# Patient Record
Sex: FEMALE | Race: ASIAN | NOT HISPANIC OR LATINO | Employment: FULL TIME
[De-identification: names, ages, dates, MRNs, and addresses within clinical notes are randomized per-mention and may not be internally consistent; named-entity substitution may affect disease eponyms.]

---

## 2024-05-19 ENCOUNTER — HEALTH MAINTENANCE LETTER (OUTPATIENT)
Age: 34
End: 2024-05-19

## 2025-01-24 ENCOUNTER — HOSPITAL ENCOUNTER (OUTPATIENT)
Facility: CLINIC | Age: 35
Discharge: HOME | DRG: 819 | End: 2025-01-25
Attending: EMERGENCY MEDICINE | Admitting: OBSTETRICS & GYNECOLOGY
Payer: COMMERCIAL

## 2025-01-24 ENCOUNTER — ANESTHESIA EVENT (OUTPATIENT)
Dept: SURGERY | Facility: CLINIC | Age: 35
DRG: 819 | End: 2025-01-24
Payer: COMMERCIAL

## 2025-01-24 ENCOUNTER — APPOINTMENT (OUTPATIENT)
Dept: ULTRASOUND IMAGING | Facility: CLINIC | Age: 35
DRG: 819 | End: 2025-01-24
Attending: EMERGENCY MEDICINE
Payer: COMMERCIAL

## 2025-01-24 ENCOUNTER — ANESTHESIA (OUTPATIENT)
Dept: SURGERY | Facility: CLINIC | Age: 35
DRG: 819 | End: 2025-01-24
Payer: COMMERCIAL

## 2025-01-24 DIAGNOSIS — O00.90 RUPTURED ECTOPIC PREGNANCY: ICD-10-CM

## 2025-01-24 LAB
ABO + RH BLD: NORMAL
ANION GAP SERPL CALCULATED.3IONS-SCNC: 12 MMOL/L (ref 7–15)
BASOPHILS # BLD AUTO: 0 10E3/UL (ref 0–0.2)
BASOPHILS NFR BLD AUTO: 0 %
BLD GP AB SCN SERPL QL: NEGATIVE
BUN SERPL-MCNC: 13.7 MG/DL (ref 6–20)
CALCIUM SERPL-MCNC: 9 MG/DL (ref 8.8–10.4)
CHLORIDE SERPL-SCNC: 99 MMOL/L (ref 98–107)
CREAT SERPL-MCNC: 0.54 MG/DL (ref 0.51–0.95)
EGFRCR SERPLBLD CKD-EPI 2021: >90 ML/MIN/1.73M2
EOSINOPHIL # BLD AUTO: 0.3 10E3/UL (ref 0–0.7)
EOSINOPHIL NFR BLD AUTO: 2 %
ERYTHROCYTE [DISTWIDTH] IN BLOOD BY AUTOMATED COUNT: 11.9 % (ref 10–15)
GLUCOSE SERPL-MCNC: 99 MG/DL (ref 70–99)
HCG INTACT+B SERPL-ACNC: ABNORMAL MIU/ML
HCO3 SERPL-SCNC: 21 MMOL/L (ref 22–29)
HCT VFR BLD AUTO: 34.8 % (ref 35–47)
HGB BLD-MCNC: 11.3 G/DL (ref 11.7–15.7)
IMM GRANULOCYTES # BLD: 0.1 10E3/UL
IMM GRANULOCYTES NFR BLD: 1 %
LYMPHOCYTES # BLD AUTO: 3.8 10E3/UL (ref 0.8–5.3)
LYMPHOCYTES NFR BLD AUTO: 29 %
MCH RBC QN AUTO: 29.4 PG (ref 26.5–33)
MCHC RBC AUTO-ENTMCNC: 32.5 G/DL (ref 31.5–36.5)
MCV RBC AUTO: 91 FL (ref 78–100)
MONOCYTES # BLD AUTO: 0.7 10E3/UL (ref 0–1.3)
MONOCYTES NFR BLD AUTO: 5 %
NEUTROPHILS # BLD AUTO: 8.5 10E3/UL (ref 1.6–8.3)
NEUTROPHILS NFR BLD AUTO: 63 %
NRBC # BLD AUTO: 0 10E3/UL
NRBC BLD AUTO-RTO: 0 /100
PLATELET # BLD AUTO: 372 10E3/UL (ref 150–450)
POTASSIUM SERPL-SCNC: 3.8 MMOL/L (ref 3.4–5.3)
RADIOLOGIST FLAGS: ABNORMAL
RBC # BLD AUTO: 3.84 10E6/UL (ref 3.8–5.2)
SODIUM SERPL-SCNC: 132 MMOL/L (ref 135–145)
SPECIMEN EXP DATE BLD: NORMAL
WBC # BLD AUTO: 13.4 10E3/UL (ref 4–11)

## 2025-01-24 PROCEDURE — 250N000025 HC SEVOFLURANE, PER MIN: Performed by: OBSTETRICS & GYNECOLOGY

## 2025-01-24 PROCEDURE — 84702 CHORIONIC GONADOTROPIN TEST: CPT | Performed by: EMERGENCY MEDICINE

## 2025-01-24 PROCEDURE — 0UT54ZZ RESECTION OF RIGHT FALLOPIAN TUBE, PERCUTANEOUS ENDOSCOPIC APPROACH: ICD-10-PCS | Performed by: OBSTETRICS & GYNECOLOGY

## 2025-01-24 PROCEDURE — 258N000003 HC RX IP 258 OP 636: Performed by: ANESTHESIOLOGY

## 2025-01-24 PROCEDURE — 99291 CRITICAL CARE FIRST HOUR: CPT | Mod: 25

## 2025-01-24 PROCEDURE — 76801 OB US < 14 WKS SINGLE FETUS: CPT

## 2025-01-24 PROCEDURE — 84520 ASSAY OF UREA NITROGEN: CPT | Performed by: EMERGENCY MEDICINE

## 2025-01-24 PROCEDURE — 250N000011 HC RX IP 250 OP 636: Performed by: ANESTHESIOLOGY

## 2025-01-24 PROCEDURE — 86900 BLOOD TYPING SEROLOGIC ABO: CPT | Performed by: EMERGENCY MEDICINE

## 2025-01-24 PROCEDURE — 85025 COMPLETE CBC W/AUTO DIFF WBC: CPT | Performed by: EMERGENCY MEDICINE

## 2025-01-24 PROCEDURE — 710N000012 HC RECOVERY PHASE 2, PER MINUTE: Performed by: OBSTETRICS & GYNECOLOGY

## 2025-01-24 PROCEDURE — 250N000011 HC RX IP 250 OP 636: Performed by: NURSE ANESTHETIST, CERTIFIED REGISTERED

## 2025-01-24 PROCEDURE — 272N000001 HC OR GENERAL SUPPLY STERILE: Performed by: OBSTETRICS & GYNECOLOGY

## 2025-01-24 PROCEDURE — 82435 ASSAY OF BLOOD CHLORIDE: CPT | Performed by: EMERGENCY MEDICINE

## 2025-01-24 PROCEDURE — 710N000009 HC RECOVERY PHASE 1, LEVEL 1, PER MIN: Performed by: OBSTETRICS & GYNECOLOGY

## 2025-01-24 PROCEDURE — 80048 BASIC METABOLIC PNL TOTAL CA: CPT | Performed by: EMERGENCY MEDICINE

## 2025-01-24 PROCEDURE — 999N000141 HC STATISTIC PRE-PROCEDURE NURSING ASSESSMENT: Performed by: OBSTETRICS & GYNECOLOGY

## 2025-01-24 PROCEDURE — 88305 TISSUE EXAM BY PATHOLOGIST: CPT | Mod: TC | Performed by: OBSTETRICS & GYNECOLOGY

## 2025-01-24 PROCEDURE — 370N000017 HC ANESTHESIA TECHNICAL FEE, PER MIN: Performed by: OBSTETRICS & GYNECOLOGY

## 2025-01-24 PROCEDURE — 88305 TISSUE EXAM BY PATHOLOGIST: CPT | Mod: 26 | Performed by: PATHOLOGY

## 2025-01-24 PROCEDURE — 36415 COLL VENOUS BLD VENIPUNCTURE: CPT | Performed by: EMERGENCY MEDICINE

## 2025-01-24 PROCEDURE — 76705 ECHO EXAM OF ABDOMEN: CPT

## 2025-01-24 PROCEDURE — 360N000075 HC SURGERY LEVEL 2, PER MIN: Performed by: OBSTETRICS & GYNECOLOGY

## 2025-01-24 PROCEDURE — 250N000009 HC RX 250: Performed by: ANESTHESIOLOGY

## 2025-01-24 RX ORDER — SODIUM CHLORIDE, SODIUM LACTATE, POTASSIUM CHLORIDE, CALCIUM CHLORIDE 600; 310; 30; 20 MG/100ML; MG/100ML; MG/100ML; MG/100ML
INJECTION, SOLUTION INTRAVENOUS CONTINUOUS PRN
Status: DISCONTINUED | OUTPATIENT
Start: 2025-01-24 | End: 2025-01-25

## 2025-01-24 RX ORDER — KETOROLAC TROMETHAMINE 30 MG/ML
INJECTION, SOLUTION INTRAMUSCULAR; INTRAVENOUS PRN
Status: DISCONTINUED | OUTPATIENT
Start: 2025-01-24 | End: 2025-01-25

## 2025-01-24 RX ORDER — PROPOFOL 10 MG/ML
INJECTION, EMULSION INTRAVENOUS PRN
Status: DISCONTINUED | OUTPATIENT
Start: 2025-01-24 | End: 2025-01-25

## 2025-01-24 RX ORDER — ONDANSETRON 2 MG/ML
INJECTION INTRAMUSCULAR; INTRAVENOUS PRN
Status: DISCONTINUED | OUTPATIENT
Start: 2025-01-24 | End: 2025-01-25

## 2025-01-24 RX ORDER — FENTANYL CITRATE 50 UG/ML
INJECTION, SOLUTION INTRAMUSCULAR; INTRAVENOUS PRN
Status: DISCONTINUED | OUTPATIENT
Start: 2025-01-24 | End: 2025-01-25

## 2025-01-24 RX ORDER — DEXAMETHASONE SODIUM PHOSPHATE 4 MG/ML
INJECTION, SOLUTION INTRA-ARTICULAR; INTRALESIONAL; INTRAMUSCULAR; INTRAVENOUS; SOFT TISSUE PRN
Status: DISCONTINUED | OUTPATIENT
Start: 2025-01-24 | End: 2025-01-25

## 2025-01-24 RX ORDER — BUPIVACAINE HYDROCHLORIDE AND EPINEPHRINE 2.5; 5 MG/ML; UG/ML
INJECTION, SOLUTION INFILTRATION; PERINEURAL PRN
Status: DISCONTINUED | OUTPATIENT
Start: 2025-01-24 | End: 2025-01-25 | Stop reason: HOSPADM

## 2025-01-24 RX ADMIN — SODIUM CHLORIDE, POTASSIUM CHLORIDE, SODIUM LACTATE AND CALCIUM CHLORIDE: 600; 310; 30; 20 INJECTION, SOLUTION INTRAVENOUS at 22:34

## 2025-01-24 RX ADMIN — ROCURONIUM BROMIDE 20 MG: 50 INJECTION, SOLUTION INTRAVENOUS at 23:17

## 2025-01-24 RX ADMIN — SODIUM CHLORIDE, POTASSIUM CHLORIDE, SODIUM LACTATE AND CALCIUM CHLORIDE: 600; 310; 30; 20 INJECTION, SOLUTION INTRAVENOUS at 23:41

## 2025-01-24 RX ADMIN — SUCCINYLCHOLINE CHLORIDE 120 MG: 20 INJECTION, SOLUTION INTRAMUSCULAR; INTRAVENOUS; PARENTERAL at 22:44

## 2025-01-24 RX ADMIN — ONDANSETRON 4 MG: 2 INJECTION INTRAMUSCULAR; INTRAVENOUS at 23:30

## 2025-01-24 RX ADMIN — FENTANYL CITRATE 50 MCG: 50 INJECTION INTRAMUSCULAR; INTRAVENOUS at 22:58

## 2025-01-24 RX ADMIN — PROPOFOL 160 MG: 10 INJECTION, EMULSION INTRAVENOUS at 22:44

## 2025-01-24 RX ADMIN — PHENYLEPHRINE HYDROCHLORIDE 100 MCG: 10 INJECTION INTRAVENOUS at 23:04

## 2025-01-24 RX ADMIN — MIDAZOLAM 2 MG: 1 INJECTION INTRAMUSCULAR; INTRAVENOUS at 22:39

## 2025-01-24 RX ADMIN — ROCURONIUM BROMIDE 30 MG: 50 INJECTION, SOLUTION INTRAVENOUS at 22:58

## 2025-01-24 RX ADMIN — DEXAMETHASONE SODIUM PHOSPHATE 4 MG: 4 INJECTION, SOLUTION INTRA-ARTICULAR; INTRALESIONAL; INTRAMUSCULAR; INTRAVENOUS; SOFT TISSUE at 22:48

## 2025-01-24 RX ADMIN — HYDROMORPHONE HYDROCHLORIDE 0.5 MG: 1 INJECTION, SOLUTION INTRAMUSCULAR; INTRAVENOUS; SUBCUTANEOUS at 23:10

## 2025-01-24 RX ADMIN — FENTANYL CITRATE 50 MCG: 50 INJECTION INTRAMUSCULAR; INTRAVENOUS at 22:44

## 2025-01-24 RX ADMIN — KETOROLAC TROMETHAMINE 30 MG: 30 INJECTION, SOLUTION INTRAMUSCULAR at 23:49

## 2025-01-24 ASSESSMENT — ACTIVITIES OF DAILY LIVING (ADL)
ADLS_ACUITY_SCORE: 41

## 2025-01-24 NOTE — LETTER
2025    Sherley Moraes   1990        To Whom it May Concern;      Please excuse Sherley Moraes from work until  due to emergent surgery at [facility] on 25.       Please do not hesitate to contact my office if you have any questions.       Sincerely,    Jena Perez MD  Obstetrics, Gynecology and Infertility  [phone]  25

## 2025-01-25 VITALS
OXYGEN SATURATION: 98 % | TEMPERATURE: 97.3 F | DIASTOLIC BLOOD PRESSURE: 77 MMHG | RESPIRATION RATE: 18 BRPM | SYSTOLIC BLOOD PRESSURE: 118 MMHG | HEART RATE: 106 BPM

## 2025-01-25 PROCEDURE — 250N000009 HC RX 250: Performed by: OBSTETRICS & GYNECOLOGY

## 2025-01-25 PROCEDURE — 250N000009 HC RX 250: Performed by: NURSE ANESTHETIST, CERTIFIED REGISTERED

## 2025-01-25 RX ORDER — SODIUM CHLORIDE, SODIUM LACTATE, POTASSIUM CHLORIDE, CALCIUM CHLORIDE 600; 310; 30; 20 MG/100ML; MG/100ML; MG/100ML; MG/100ML
INJECTION, SOLUTION INTRAVENOUS CONTINUOUS
Status: DISCONTINUED | OUTPATIENT
Start: 2025-01-25 | End: 2025-01-25 | Stop reason: HOSPADM

## 2025-01-25 RX ORDER — DEXAMETHASONE SODIUM PHOSPHATE 4 MG/ML
4 INJECTION, SOLUTION INTRA-ARTICULAR; INTRALESIONAL; INTRAMUSCULAR; INTRAVENOUS; SOFT TISSUE
Status: DISCONTINUED | OUTPATIENT
Start: 2025-01-25 | End: 2025-01-25 | Stop reason: HOSPADM

## 2025-01-25 RX ORDER — ACETAMINOPHEN 325 MG/1
975 TABLET ORAL ONCE
Status: DISCONTINUED | OUTPATIENT
Start: 2025-01-25 | End: 2025-01-25 | Stop reason: HOSPADM

## 2025-01-25 RX ORDER — OXYCODONE HYDROCHLORIDE 5 MG/1
5 TABLET ORAL
Status: DISCONTINUED | OUTPATIENT
Start: 2025-01-25 | End: 2025-01-25 | Stop reason: HOSPADM

## 2025-01-25 RX ORDER — FENTANYL CITRATE 0.05 MG/ML
50 INJECTION, SOLUTION INTRAMUSCULAR; INTRAVENOUS EVERY 5 MIN PRN
Status: DISCONTINUED | OUTPATIENT
Start: 2025-01-25 | End: 2025-01-25 | Stop reason: HOSPADM

## 2025-01-25 RX ORDER — IBUPROFEN 400 MG/1
800 TABLET, FILM COATED ORAL ONCE
Status: DISCONTINUED | OUTPATIENT
Start: 2025-01-25 | End: 2025-01-25 | Stop reason: HOSPADM

## 2025-01-25 RX ORDER — FENTANYL CITRATE 0.05 MG/ML
25 INJECTION, SOLUTION INTRAMUSCULAR; INTRAVENOUS EVERY 5 MIN PRN
Status: DISCONTINUED | OUTPATIENT
Start: 2025-01-25 | End: 2025-01-25 | Stop reason: HOSPADM

## 2025-01-25 RX ORDER — HYDROMORPHONE HCL IN WATER/PF 6 MG/30 ML
0.4 PATIENT CONTROLLED ANALGESIA SYRINGE INTRAVENOUS EVERY 5 MIN PRN
Status: DISCONTINUED | OUTPATIENT
Start: 2025-01-25 | End: 2025-01-25 | Stop reason: HOSPADM

## 2025-01-25 RX ORDER — NALOXONE HYDROCHLORIDE 0.4 MG/ML
0.1 INJECTION, SOLUTION INTRAMUSCULAR; INTRAVENOUS; SUBCUTANEOUS
Status: DISCONTINUED | OUTPATIENT
Start: 2025-01-25 | End: 2025-01-25 | Stop reason: HOSPADM

## 2025-01-25 RX ORDER — ONDANSETRON 2 MG/ML
4 INJECTION INTRAMUSCULAR; INTRAVENOUS EVERY 30 MIN PRN
Status: DISCONTINUED | OUTPATIENT
Start: 2025-01-25 | End: 2025-01-25 | Stop reason: HOSPADM

## 2025-01-25 RX ORDER — ONDANSETRON 4 MG/1
4 TABLET, ORALLY DISINTEGRATING ORAL EVERY 30 MIN PRN
Status: DISCONTINUED | OUTPATIENT
Start: 2025-01-25 | End: 2025-01-25 | Stop reason: HOSPADM

## 2025-01-25 RX ORDER — HYDROMORPHONE HCL IN WATER/PF 6 MG/30 ML
0.2 PATIENT CONTROLLED ANALGESIA SYRINGE INTRAVENOUS EVERY 5 MIN PRN
Status: DISCONTINUED | OUTPATIENT
Start: 2025-01-25 | End: 2025-01-25 | Stop reason: HOSPADM

## 2025-01-25 RX ADMIN — Medication 200 MG: at 00:02

## 2025-01-25 ASSESSMENT — ACTIVITIES OF DAILY LIVING (ADL): ADLS_ACUITY_SCORE: 41

## 2025-01-25 NOTE — BRIEF OP NOTE
OB/GYN Brief Operative Note    Pre-operative diagnosis: Ruptured right ectopic pregnancy  Hemoperitoneum   Post-operative diagnosis: Same   Procedure: Laparoscopic right salpingectomy, removal of ectopic pregnancy   Surgeon: Staci Perez MD   Assistant(s): OR Staff   Anesthesia: General Endotracheal Anesthesia   Estimated blood loss: 10 mL   Total IV fluids: (See anesthesia record)   Blood transfusion: No transfusion was given during surgery   Total urine output: (See anesthesia record)  100ml   Drains: None   Specimens: Ectopic pregnancy   Findings: Large amount of hemoperitoneum, approximately 300 mL of clot evacuated from abdomen.   Large right ectopic pregnancy - approximately 4 cm in diameter extending the entire length of the fallopian tube. The uterus, left ovary, right ovary and left fallopian tube appeared normal.   The colon appeared inflamed and there was a portion that I questioned whether or not the serosa was denuded but Dr. Aguayo came in and felt it was not involving the bowel, just the epiploica.    Complications: None   Condition: Stable, transferred to PACU   Comments: See accompanying operative report for full details     Staci Perez MD  OB/GYN & Infertility  January 24, 2025

## 2025-01-25 NOTE — ANESTHESIA PREPROCEDURE EVALUATION
"Anesthesia Pre-Procedure Evaluation    Patient: Sherley Moraes   MRN: [id number] : 1990        Procedure : Procedure(s):  Laparoscopy diagnostic (gyn) possible salpingectomy          No past medical history on file.   No past surgical history on file.   Not on File   Social History     Tobacco Use    Smoking status: Not on file    Smokeless tobacco: Not on file   Substance Use Topics    Alcohol use: Not on file      Wt Readings from Last 1 Encounters:   No data found for Wt        Anesthesia Evaluation   Pt has not had prior anesthetic         ROS/MED HX  ENT/Pulmonary:  - neg pulmonary ROS  (-) sleep apnea   Neurologic:  - neg neurologic ROS     Cardiovascular:  - neg cardiovascular ROS     METS/Exercise Tolerance:     Hematologic:       Musculoskeletal:       GI/Hepatic:  - neg GI/hepatic ROS  (-) GERD   Renal/Genitourinary:    (-) renal disease   Endo:  - neg endo ROS     Psychiatric/Substance Use:       Infectious Disease:       Malignancy:       Other:            Physical Exam    Airway        Mallampati: II   TM distance: > 3 FB   Neck ROM: full   Mouth opening: > 3 cm    Respiratory Devices and Support         Dental       (+) Minor Abnormalities - some fillings, tiny chips      Cardiovascular   cardiovascular exam normal          Pulmonary   pulmonary exam normal                OUTSIDE LABS:  CBC:   Lab Results   Component Value Date    WBC 13.4 (H) 2025    HGB 11.3 (L) 2025    HCT 34.8 (L) 2025     2025     BMP:   Lab Results   Component Value Date     (L) 2025    POTASSIUM 3.8 2025    CHLORIDE 99 2025    CO2 21 (L) 2025    BUN 13.7 2025    CR 0.54 2025    GLC 99 2025     COAGS: No results found for: \"PTT\", \"INR\", \"FIBR\"  POC: No results found for: \"BGM\", \"HCG\", \"HCGS\"  HEPATIC: No results found for: \"ALBUMIN\", \"PROTTOTAL\", \"ALT\", \"AST\", \"GGT\", \"ALKPHOS\", \"BILITOTAL\", \"BILIDIRECT\", \"RONIT\"  OTHER:   Lab Results "   Component Value Date    MOLINA 9.0 01/24/2025       Anesthesia Plan    ASA Status:  1, emergent    NPO Status:  ELEVATED Aspiration Risk/Unknown    Anesthesia Type: General.     - Airway: ETT   Induction: Intravenous, Propofol, RSI.   Maintenance: Balanced.        Consents    Anesthesia Plan(s) and associated risks, benefits, and realistic alternatives discussed. Questions answered and patient/representative(s) expressed understanding.     - Discussed:     - Discussed with:  Patient            Postoperative Care    Pain management: IV analgesics.   PONV prophylaxis: Ondansetron (or other 5HT-3)     Comments:               Ramos Greene MD    I have reviewed the pertinent notes and labs in the chart from the past 30 days and (re)examined the patient.  Any updates or changes from those notes are reflected in this note.    Clinically Significant Risk Factors Present on Admission         # Hyponatremia: Lowest Na = 132 mmol/L in last 2 days, will monitor as appropriate

## 2025-01-25 NOTE — ED PROVIDER NOTES
Emergency Department Note      History of Present Illness     Chief Complaint   Vaginal Bleeding - Pregnant      HPI   Sherley Moraes is a 34 year old female G1, P0 female who came in via EMS for further evaluation of abdominal cramping and vaginal bleeding.  She states she believes she is about 8 weeks pregnant based on her LMP.  She apparently has had some brown discharge since becoming pregnant, but today she developed pelvic cramping and bright red bleeding.  She does not feel lightheaded or dizzy.  She also denies any urinary symptoms, fevers, or any other symptoms.  She has an appointment with an OB/GYN in the middle of next week.    Independent Historian   None    Review of External Notes   None    Past Medical History     Medical History and Problem List   No past medical history on file.    Medications   Prenatal vitamins    Surgical History   No past surgical history on file.    Physical Exam     Patient Vitals for the past 24 hrs:   BP Temp Temp src Pulse Resp SpO2   01/24/25 2200 104/68 -- -- 97 16 100 %   01/24/25 2130 107/68 -- -- 82 16 100 %   01/24/25 2017 122/83 98  F (36.7  C) Oral 84 16 94 %     Physical Exam  Nursing note and vitals reviewed.  Constitutional:  Oriented to person, place, and time. Cooperative.   HENT:   Nose:    Nose normal.   Mouth/Throat:   Mucous membranes are normal.   Eyes:    Conjunctivae normal and EOM are normal.      Pupils are equal, round, and reactive to light.   Neck:    Trachea normal.   Cardiovascular:  Normal rate, regular rhythm, normal heart sounds and normal pulses. No murmur heard.  Pulmonary/Chest:  Effort normal and breath sounds normal.   Abdominal:   Somewhat bloated but otherwise normal appearance and bowel sounds are normal.      There is some tenderness to palpation in the lower abdomen.      There is no rebound and no CVA tenderness.   Musculoskeletal:  Extremities atraumatic x 4.   Lymphadenopathy:  No cervical adenopathy.   Neurological:   Alert  and oriented to person, place, and time. Normal strength.      No cranial nerve deficit or sensory deficit. GCS eye subscore is 4. GCS verbal subscore is 5. GCS motor subscore is 6.   Skin:    Skin is intact. No rash noted.   Psychiatric:   Normal mood and affect.      Diagnostics     Lab Results   Labs Ordered and Resulted from Time of ED Arrival to Time of ED Departure   BASIC METABOLIC PANEL - Abnormal       Result Value    Sodium 132 (*)     Potassium 3.8      Chloride 99      Carbon Dioxide (CO2) 21 (*)     Anion Gap 12      Urea Nitrogen 13.7      Creatinine 0.54      GFR Estimate >90      Calcium 9.0      Glucose 99     HCG QUANTITATIVE PREGNANCY - Abnormal    hCG Quantitative 10,516 (*)    CBC WITH PLATELETS AND DIFFERENTIAL - Abnormal    WBC Count 13.4 (*)     RBC Count 3.84      Hemoglobin 11.3 (*)     Hematocrit 34.8 (*)     MCV 91      MCH 29.4      MCHC 32.5      RDW 11.9      Platelet Count 372      % Neutrophils 63      % Lymphocytes 29      % Monocytes 5      % Eosinophils 2      % Basophils 0      % Immature Granulocytes 1      NRBCs per 100 WBC 0      Absolute Neutrophils 8.5 (*)     Absolute Lymphocytes 3.8      Absolute Monocytes 0.7      Absolute Eosinophils 0.3      Absolute Basophils 0.0      Absolute Immature Granulocytes 0.1      Absolute NRBCs 0.0     ROUTINE UA WITH MICROSCOPIC REFLEX TO CULTURE   TYPE AND SCREEN, ADULT    ABO/RH(D) O POS      Antibody Screen Negative      SPECIMEN EXPIRATION DATE [id number]     ABO/RH TYPE AND SCREEN       Imaging   US OB <14 Weeks W Transvaginal   Final Result   Abnormal   IMPRESSION:    1.  Ruptured right tubal ectopic pregnancy with gestational sac and embryo visualized. No cardiac activity.   2.  Moderate volume blood products in the dependent pelvis.         [Critical Result: Positive ectopic pregnancy]      Finding was identified on 1/24/2025 9:38 PM CST.       Dr. Yao was contacted by me on 1/24/2025 9:42 PM CST and verbalized  understanding of the critical result.       POC US ABDOMEN LIMITED   Final Result   No IUP visualized.  Free fluid in the pelvis.     Read by Dr. Yao         Independent Interpretation   I independently interpreted my own bedside point-of-care ultrasound, which appeared to show free fluid in the pelvis and no IUP.    ED Course      Medications Administered   Medications - No data to display    Procedures   Procedures     Discussion of Management   OB/GYN, Dr. Perez     ED Course        Additional Documentation  None    Medical Decision Making / Diagnosis     CMS Diagnoses: None    MIPS       None    MDM   Sherley Moraes is a 34 year old female brought in by ambulance for further evaluation of pelvic pain and vaginal bleeding in the setting of being about 8 weeks pregnant by her own LMP.  I immediately performed my own limited bedside ultrasound, and I did not visualize an IUP, and it appeared that she had free fluid in the pelvis.  Therefore I was quite concerned for a ruptured ectopic pregnancy or possibly a ruptured ovarian cyst.  I proceeded with the above blood work and formal ultrasound.  The ultrasound technician actually called me to inform me of the results, which was also reported to me shortly thereafter by the radiologist.  Specifically she has a ruptured ectopic pregnancy on the right.  I spoke with the on-call OB/GYN as well, Dr. Perez, who is planning on bringing the patient to the OR.    Disposition   The patient was admitted to the hospital.     Diagnosis     ICD-10-CM    1. Ruptured ectopic pregnancy  O00.90              MD Maximilian Singh Seth H, MD  01/24/25 3488

## 2025-01-25 NOTE — ANESTHESIA POSTPROCEDURE EVALUATION
Patient: Sherley Moraes    Procedure: Procedure(s):  Laparoscopic Removal of  Right Ectopic Pregnancy and Right Salpingectomy       Anesthesia Type:  General    Note:     Postop Pain Control: Uneventful            Sign Out: Well controlled pain   PONV: No   Neuro/Psych: Uneventful            Sign Out: Acceptable/Baseline neuro status   Airway/Respiratory: Uneventful            Sign Out: Acceptable/Baseline resp. status   CV/Hemodynamics: Uneventful            Sign Out: Acceptable CV status; No obvious hypovolemia; No obvious fluid overload   Other NRE: NONE   DID A NON-ROUTINE EVENT OCCUR? No           Last vitals:  Vitals:    01/24/25 2017 01/24/25 2130 01/24/25 2200   BP: 122/83 107/68 104/68   Pulse: 84 82 97   Resp: 16 16 16   Temp: 36.7  C (98  F)     SpO2: 94% 100% 100%       Electronically Signed By: Ramos Greene MD  January 25, 2025  12:21 AM

## 2025-01-25 NOTE — OR NURSING
Discharge instructions reviewed with patient  patient's  Marcell who verbalized understanding of discharge instructions including medication administration, when to call surgeon and recommended follow up care as noted on discharge instructions. Barriers to learning identified and addressed: .None observed.   Written discharge instructions given to patient/responsible adult  and has no further question. Prescription : given to  earlier. Patient's belongings were returned to the patient and care taker. Patient was discharged in stable condition. Patient was transferred to a wheel chair and was accompanied by RN to the hospital entrance door to be discharged to patient's home.

## 2025-01-25 NOTE — ANESTHESIA PROCEDURE NOTES
Airway       Patient location during procedure: OR       Procedure Start/Stop Times: 1/24/2025 10:46 PM  Staff -        Anesthesiologist:  Ramos Greene MD       CRNA: Maricruz Little       Performed By: CRNA  Consent for Airway        Urgency: elective  Indications and Patient Condition       Indications for airway management: gretchen-procedural       Induction type:RSI       Mask difficulty assessment: 0 - not attempted    Final Airway Details       Final airway type: endotracheal airway       Successful airway: ETT - single and Oral  Endotracheal Airway Details        ETT size (mm): 7.0       Cuffed: yes       Successful intubation technique: video laryngoscopy       VL Blade Size: Glidescope 3       Grade View of Cords: 1       Adjucts: stylet       Position: Right       Measured from: gums/teeth       Secured at (cm): 21       Bite block used: None    Post intubation assessment        Placement verified by: capnometry, equal breath sounds and chest rise        Number of attempts at approach: 1       Number of other approaches attempted: 0       Secured with: tape       Ease of procedure: easy       Dentition: Intact and Unchanged    Medication(s) Administered   Medication Administration Time: 1/24/2025 10:46 PM

## 2025-01-25 NOTE — ED TRIAGE NOTES
Pt BIBA due to complaints of cramping, mid to lower abdomen, radiating into rectum. Pt 8 weeks pregnant. Prior to cramping she has had a brown clumpy discharge consistently throughout pregnancy that changed to bright red spotting around 1130 today. Pain is continuous. Started 1930. IV attempted. Has not had an official OB visit.

## 2025-01-25 NOTE — H&P
OB/GYN Consultation    CC:  ruptured ectopic pregnancy    HPI:  Sherley Moraes is a 34 year old F who presents for vaginal bleeding and abdominal pain in pregnancy. She is approximately 8 weeks by LMP.   She reports severe abdominal/pelvic pain and bright red bleeding starting today. She was scheduled for her first OB visit at [facility] next week.     Patient works as a teacher. She is here with her partner, Douglas. Lives in [place].           ROS:  Negative except as listed above in HPI.    EXAM:  Blood pressure 104/68, pulse 97, temperature 98  F (36.7  C), temperature source Oral, resp. rate 16, SpO2 100%.        General - pleasant female in no acute distress.  Neck - supple without lymphadenopathy or thyromegaly.  Lungs - clear to auscultation bilaterally.  Heart - regular rate and rhythm without murmur.  Abdomen - soft, nontender, nondistended, no hepatosplenomegaly.  Musculoskeletal - no gross deformities.  Neurological - normal strength, sensation, and mental status.    Component      Latest Ref Rn 1/24/2025  8:33 PM   WBC      4.0 - 11.0 10e3/uL 13.4 (H)    RBC Count      3.80 - 5.20 10e6/uL 3.84    Hemoglobin      11.7 - 15.7 g/dL 11.3 (L)    Hematocrit      35.0 - 47.0 % 34.8 (L)    MCV      78 - 100 fL 91    MCH      26.5 - 33.0 pg 29.4    MCHC      31.5 - 36.5 g/dL 32.5    RDW      10.0 - 15.0 % 11.9    Platelet Count      150 - 450 10e3/uL 372    % Neutrophils      % 63    % Lymphocytes      % 29    % Monocytes      % 5    % Eosinophils      % 2    % Basophils      % 0    % Immature Granulocytes      % 1    NRBCs per 100 WBC      <1 /100 0    Absolute Neutrophils      1.6 - 8.3 10e3/uL 8.5 (H)    Absolute Lymphocytes      0.8 - 5.3 10e3/uL 3.8    Absolute Monocytes      0.0 - 1.3 10e3/uL 0.7    Absolute Eosinophils      0.0 - 0.7 10e3/uL 0.3    Absolute Basophils      0.0 - 0.2 10e3/uL 0.0    Absolute Immature Granulocytes      <=0.4 10e3/uL 0.1    Absolute NRBCs      10e3/uL 0.0    Sodium       135 - 145 mmol/L 132 (L)    Potassium      3.4 - 5.3 mmol/L 3.8    Chloride      98 - 107 mmol/L 99    Carbon Dioxide (CO2)      22 - 29 mmol/L 21 (L)    Anion Gap      7 - 15 mmol/L 12    Urea Nitrogen      6.0 - 20.0 mg/dL 13.7    Creatinine      0.51 - 0.95 mg/dL 0.54    GFR Estimate      >60 mL/min/1.73m2 >90    Calcium      8.8 - 10.4 mg/dL 9.0    Glucose      70 - 99 mg/dL 99    ABO/Rh(D) O POS    Antibody Screen      Negative  Negative    SPECIMEN EXPIRATION DATE [id number]    hCG Quantitative      <5 mIU/mL 10,516 (H)       Legend:  (H) High  (L) Low    Imaging:  EXAM: US OB <14 WEEKS WITH TRANSVAGINAL SINGLE  LOCATION: [facility]  DATE: 2025     INDICATION: preg, bleeding, pain  COMPARISON: None.  TECHNIQUE: Transabdominal scans were performed. Endovaginal ultrasound was performed to better visualize the embryo.     FINDINGS:  UTERUS: No intrauterine gestational sac is visualized.     RIGHT OVARY/ADNEXA: Right ovary is not visualized, likely obscured by surrounding structures. Masslike structure in the right adnexa measuring up to 3.3 cm. In this area there is a gestational sac with an internal embryo, CRL measures 0.92 cm, equals 7   weeks 0 days. No cardiac activity is visualized.     LEFT OVARY: Normal.     There is significant echogenic free fluid in the pelvis, compatible with blood products.                                                                      IMPRESSION:   1.  Ruptured right tubal ectopic pregnancy with gestational sac and embryo visualized. No cardiac activity.  2.  Moderate volume blood products in the dependent pelvis.        [Critical Result: Positive ectopic pregnancy]     Finding was identified on 2025 9:38 PM CST.      Dr. Yao was contacted by me on 2025 9:42 PM CST and verbalized understanding of the critical result.     ASSESSMENT/PLAN:  Sherley Moraes is a 35 yo  who presents to the ED with abdominal pain and bleeding,  found to have a ruptured ectopic pregnancy.  Reviewed patient history and images from pelvic US, consistent with tubal pregnancy  Recommend urgent laparoscopy, removal of ectopic pregnancy and likely right salpingectomy. Discussed risks, benefits and alternatives to the procedure in detail and she agreed to proceed. Written consent was obtained.   Patient is Rh positive so Rhogam is not indicated  She may be able to discharge from PACU if meeting postop goals, otherwise can stay in obs if needed for pain control.  Plan to follow up at [facility] for postoperative care; I will send a message to get her scheduled.    Staci Perez MD  OB/GYN & Infertility  Pager [phone]  01/24/25

## 2025-01-25 NOTE — OP NOTE
GYN Operative Note    Sherley Moraes   [id number]  1990      DOS: 1/25/2025     Pre-operative Diagnosis:   Ruptured right ectopic pregnancy  Hemoperitoneum     Post-operative Diagnosis: Same as above    Procedure: Laparoscopic right salpingectomy, removal of ectopic pregnancy, evacuation of hemoperitoneum    Surgeon:   Jena Perez MD    Assistants:   OR staff    Anesthesia: GETA    EBL: 10 mL    UOP: 100 cc clear urine at the beginning of the procedure    Complications: None apparent    Specimen: Right fallopian tube and ectopic pregnancy    Findings: Large amount of hemoperitoneum, approximately 300 mL of clot evacuated from abdomen. Normal appearing upper abdomen. Large right ectopic pregnancy - approximately 4 cm in diameter extending the entire length of the fallopian tube. The uterus, left ovary, right ovary and left fallopian tube appeared normal. The colon appeared inflamed and there was a portion that I questioned whether or not the serosa was denuded but Dr. Sauceda came in and felt it was not involving the bowel, just the epiploica.     Indications: Sherley Moraes is a 35 yo G1 female around 8 weeks gestation who presented to the ED with severe abdominal pain and vaginal bleeding. Pelvic ultrasound was done showing a ruptured ectopic pregnancy in the right fallopian tube and large amount of hemoperitoneum. HCG level was 10,516. Emergent surgery was recommended to remove the ectopic pregnancy and right fallopian tube and she agreed to proceed. Written consent was obtained.     Procedure: The patient was taken to the operating room and placed under general anesthesia. Patient was placed in dorsal lithotomy position with yellowfin stirups. Patient was prepped and draped in the usual fashion. After a time-out was preformed, a sterile speculum was placed. The anterior lip of the cervix was grasped with single tooth tenaculum. A [employer] uterine manipulator was placed. The speculum was removed.  Her bladder was straight catheterized for 100 mL of urine.    Attention was then turned to the abdomen. The umbilicus was elevated and a 5mm vertical incision was made with the scalpel in the inferior aspect of the umbilicus.  The abdomen was elevated. A Veress needle was inserted without difficulty. The opening pressure was noted at 4mmHg and the abdomen was insufflated to 15 mmHg. A 5mm trocar was placed without difficulty. Two left lateral ports were placed under direct visualization in a similar fashion without difficulty, a 5 mm port on the most lateral side and a 10 mm port on the more medial side. The abdomen was inspected and the findings are as above. The hemoperitoneum was suctioned out. The right fallopian tube was inspected and elevated. The Ligasure device was used to cauterize and cut along the inferior edge of the tube along the mesosalpinx, transecting the tube at the level of the right cornua. The tube with ectopic pregnancy was then placed into a 10 mm Endocatch bag. The 10 mm port was removed and the incision was opened slightly larger with a scalpel to accommodate the specimen. The specimen and Endocatch bag were removed from the abdomen and the specimen was sent to pathology. The pelvis was irrigated and suctioned dry. The surgical sites were inspected and noted to be hemostatic. I had concern about one area on the colon near the right adnexa, and Dr. Sauceda from [facility] came in and looked at the area. She felt it was just inflammation around the bowel and epiploica but there was no evidence of injury to the bowel itself.  The lateral ports were removed under direct visualization. The fascia of the right 10mm port was closed with a Christiano-Gonsalo without difficulty.  Another figure of X suture of 0-Vicryl was placed to ensure closure of the incision. The camera and umbilical port removed. The patient was flattened and the abdomen was desufflated. She was given a few positive pressure  breaths. The port sites were noted to be hemostatic and closed in a standard subcuticular fashion with 4-0 Monocryl. The incisions were injected with 0.25% marcaine. Steri strips and sterile bandages were placed.  Attention was turned back to the vagina and the uterine manipulator was removed. The patient was cleaned and awoken from anesthesia. She was transferred to the PACU in stable condition.      Staci Perez MD  OB/GYN & Infertility  Pager [phone]

## 2025-01-25 NOTE — DISCHARGE INSTRUCTIONS
Today you received Toradol, an antiinflammatory medication similar to Ibuprofen at 11:50 PM.  You should not take other antiinflammatory medication, such as Ibuprofen, Motrin, Advil, Aleve, Naprosyn, etc until 5:50 AM.     [facility]  D&C OR Laparoscopy  Discharge Instructions    ACTIVITY:  You may restart normal activities as your abdominal discomfort disappears.  You may expect some discomfort under the ribs and shoulder area for the first 24 hours.  In nearly all cases, this will disappear shortly after the first day.  It is certainly permissible to climb stairs, shower, and do ordinary, quiet activities.  More vigorous activities such as sports, intercourse and work may be resumed in 48-72 hours as seems to befit your situation.    OFFICE VISIT:  Please call a day or two after your surgery to make an appointment in approximately 2 weeks to discuss the results of your surgery and have your check-up.    VAGINAL DISCHARGE:  You may have some bloody vaginal discharge for as long as one week.  Ordinary tampons may be used after 3-4 days.     INCISIONAL CARE: Keep incisions clean and dry for 24 hours.  You may shower tomorrow.  No bathing or swimming for 3-5 days. If you have steri-strips, they should remain in place 3-5 days, after which they can be removed.      TEMPERATURE:  If you develop a temperature elevation of 101  or higher, please call our office immediately.    RESTRICTIONS:  Due to the effects of general anesthesia, please do not drive a car, drink alcoholic beverages, nor operate complex machinery in the first 24 hours following surgery.    PLEASE FEEL FREE TO CALL OUR OFFICE IF ANY QUESTIONS OR PROBLEMS ARISE.    [facility]   _______________________________________________________________________________                   [facility]              [address] [place] [address] [address]             [facility] [id number]  [place] [address]            [phone] (Telephone)                                               [phone] (Telephone)            [phone] (Fax)  [phone] (Fax)            [facility]        Same Day Surgery Discharge Instructions for  Sedation and General Anesthesia     It's not unusual to feel dizzy, light-headed or faint for up to 24 hours after surgery or while taking pain medication.  If you have these symptoms: sit for a few minutes before standing and have someone assist you when you get up to walk or use the bathroom.    You should rest and relax for the next 24 hours. We recommend you make arrangements to have an adult stay with you for at least 24 hours after your discharge.  Avoid hazardous and strenuous activity.    DO NOT DRIVE any vehicle or operate mechanical equipment for 24 hours following the end of your surgery.  Even though you may feel normal, your reactions may be affected by the medication you have received.    Do not drink alcoholic beverages for 24 hours following surgery.     Slowly progress to your regular diet as you feel able. It's not unusual to feel nauseated and/or vomit after receiving anesthesia.  If you develop these symptoms, drink clear liquids (apple juice, ginger ale, broth, 7-up, etc. ) until you feel better.  If your nausea and vomiting persists for 24 hours, please notify your surgeon.      All narcotic pain medications, along with inactivity and anesthesia, can cause constipation. Drinking plenty of liquids and increasing fiber intake will help.    For any questions of a medical nature, call your surgeon.    Do not make important decisions for 24 hours.    If you had general anesthesia, you may have a sore throat for a couple of days related to the breathing tube used during surgery.  You may use Cepacol lozenges to help with this discomfort.  If it  worsens or if you develop a fever, contact your surgeon.     If you feel your pain is not well managed with the pain medications prescribed by your surgeon, please contact your surgeon's office to let them know so they can address your concerns.         **If you have questions or concerns about your procedure,   Call Dr. Perez at [phone]**

## 2025-01-25 NOTE — ANESTHESIA CARE TRANSFER NOTE
Patient: Sherley Moraes    Procedure: Procedure(s):  Laparoscopic Removal of  Right Ectopic Pregnancy and Right Salpingectomy       Diagnosis: Ectopic pregnancy [O00.90]  Diagnosis Additional Information: No value filed.    Anesthesia Type:   General     Note:    Oropharynx: oropharynx clear of all foreign objects  Level of Consciousness: awake  Oxygen Supplementation: face mask  Level of Supplemental Oxygen (L/min / FiO2): 6  Independent Airway: airway patency satisfactory and stable  Dentition: dentition unchanged  Vital Signs Stable: post-procedure vital signs reviewed and stable  Report to RN Given: handoff report given  Patient transferred to: PACU    Handoff Report: Identifed the Patient, Identified the Reponsible Provider, Reviewed the pertinent medical history, Discussed the surgical course, Reviewed Intra-OP anesthesia mangement and issues during anesthesia, Set expectations for post-procedure period and Allowed opportunity for questions and acknowledgement of understanding  Vitals:  Vitals Value Taken Time   BP     Temp     Pulse     Resp     SpO2         Electronically Signed By: YOLY Cortés CRNA  January 25, 2025  12:19 AM

## 2025-01-29 LAB
PATH REPORT.COMMENTS IMP SPEC: NORMAL
PATH REPORT.COMMENTS IMP SPEC: NORMAL
PATH REPORT.FINAL DX SPEC: NORMAL
PATH REPORT.GROSS SPEC: NORMAL
PATH REPORT.MICROSCOPIC SPEC OTHER STN: NORMAL
PATH REPORT.RELEVANT HX SPEC: NORMAL
PHOTO IMAGE: NORMAL

## 2025-06-08 ENCOUNTER — HEALTH MAINTENANCE LETTER (OUTPATIENT)
Age: 35
End: 2025-06-08

## (undated) DEVICE — ENDO SCOPE WARMER LF TM500

## (undated) DEVICE — ENDO POUCH UNIV RETRIEVAL SYSTEM INZII 10MM CD001

## (undated) DEVICE — NDL INSUFFLATION 13GA 120MM C2201

## (undated) DEVICE — SUCTION IRR STRYKERFLOW II W/TIP 250-070-520

## (undated) DEVICE — SOL WATER IRRIG 1000ML BOTTLE 2F7114

## (undated) DEVICE — ENDO TROCAR FIRST ENTRY KII FIOS ADV FIX 05X100MM CFF03

## (undated) DEVICE — LINEN TOWEL PACK X5 5464

## (undated) DEVICE — ESU LIGASURE LAPAROSCOPIC BLUNT TIP SEALER 5MMX37CM LF1837

## (undated) DEVICE — DEVICE SUTURE PASSER 14GA WECK EFX EFXSP2

## (undated) DEVICE — TUBING C02 INSUFFLATION LAP FILTER HEATER 6198

## (undated) DEVICE — SU VICRYL 0 CT-1 27" UND J260H

## (undated) DEVICE — UTERINE MANIPULATOR RUMI 6.7MMX10CM UMG670

## (undated) DEVICE — Device

## (undated) DEVICE — SU VICRYL+ 0 27 UR6 VLT VCP603H

## (undated) DEVICE — SU MONOCRYL 4-0 PS-2 18" UND Y496G

## (undated) DEVICE — SOL NACL 0.9% INJ 1000ML BAG 2B1324X

## (undated) DEVICE — UTERINE MANIPULATOR RUMI 6.7MMX6CM UMW676

## (undated) DEVICE — KIT PATIENT POSITIONING PIGAZZI LATEX FREE 40580

## (undated) DEVICE — ESU HOLDER LAP INST DISP PURPLE LONG 330MM H-PRO-330

## (undated) DEVICE — BAG DECANTER STERILE WHITE DYNJDEC09

## (undated) DEVICE — ESU GROUND PAD UNIVERSAL W/O CORD

## (undated) DEVICE — ENDO TROCAR FIRST ENTRY KII FIOS Z-THRD 11X100MM CTF33

## (undated) DEVICE — UTERINE MANIPULATOR RUMI 6.7MMX8CM UMB678

## (undated) DEVICE — ENDO TROCAR SLEEVE KII ADV FIXATION 05X100MM CFS02

## (undated) DEVICE — UTERINE MANIPULATOR RUMI 5.1MMX6CM UML516

## (undated) DEVICE — TUBING SUCTION MEDI-VAC SOFT 3/16"X20' N520A

## (undated) RX ORDER — FENTANYL CITRATE 50 UG/ML
INJECTION, SOLUTION INTRAMUSCULAR; INTRAVENOUS
Status: DISPENSED
Start: 2025-01-24

## (undated) RX ORDER — BUPIVACAINE HYDROCHLORIDE AND EPINEPHRINE 2.5; 5 MG/ML; UG/ML
INJECTION, SOLUTION EPIDURAL; INFILTRATION; INTRACAUDAL; PERINEURAL
Status: DISPENSED
Start: 2025-01-24

## (undated) RX ORDER — HYDROMORPHONE HYDROCHLORIDE 1 MG/ML
INJECTION, SOLUTION INTRAMUSCULAR; INTRAVENOUS; SUBCUTANEOUS
Status: DISPENSED
Start: 2025-01-24